# Patient Record
Sex: FEMALE | Race: WHITE
[De-identification: names, ages, dates, MRNs, and addresses within clinical notes are randomized per-mention and may not be internally consistent; named-entity substitution may affect disease eponyms.]

---

## 2019-03-20 ENCOUNTER — HOSPITAL ENCOUNTER (EMERGENCY)
Dept: HOSPITAL 26 - MED | Age: 34
LOS: 1 days | Discharge: HOME | End: 2019-03-21
Payer: SELF-PAY

## 2019-03-20 VITALS — DIASTOLIC BLOOD PRESSURE: 73 MMHG | SYSTOLIC BLOOD PRESSURE: 125 MMHG

## 2019-03-20 VITALS — WEIGHT: 221.25 LBS | BODY MASS INDEX: 37.77 KG/M2 | HEIGHT: 64 IN

## 2019-03-20 DIAGNOSIS — O20.0: Primary | ICD-10-CM

## 2019-03-20 LAB
BASOPHILS # BLD AUTO: 0 K/UL (ref 0–0.22)
BASOPHILS NFR BLD AUTO: 0.3 % (ref 0–2)
EOSINOPHIL # BLD AUTO: 0.2 K/UL (ref 0–0.4)
EOSINOPHIL NFR BLD AUTO: 1.3 % (ref 0–4)
ERYTHROCYTE [DISTWIDTH] IN BLOOD BY AUTOMATED COUNT: 16.6 % (ref 11.6–13.7)
HCT VFR BLD AUTO: 38.2 % (ref 36–48)
HGB BLD-MCNC: 12.4 G/DL (ref 12–16)
LYMPHOCYTES # BLD AUTO: 3.4 K/UL (ref 2.5–16.5)
LYMPHOCYTES NFR BLD AUTO: 29.1 % (ref 20.5–51.1)
MCH RBC QN AUTO: 26 PG (ref 27–31)
MCHC RBC AUTO-ENTMCNC: 33 G/DL (ref 33–37)
MCV RBC AUTO: 79.9 FL (ref 80–94)
MONOCYTES # BLD AUTO: 0.6 K/UL (ref 0.8–1)
MONOCYTES NFR BLD AUTO: 5.2 % (ref 1.7–9.3)
NEUTROPHILS # BLD AUTO: 7.5 K/UL (ref 1.8–7.7)
NEUTROPHILS NFR BLD AUTO: 64.1 % (ref 42.2–75.2)
PLATELET # BLD AUTO: 208 K/UL (ref 140–450)
RBC # BLD AUTO: 4.78 MIL/UL (ref 4.2–5.4)
WBC # BLD AUTO: 11.8 K/UL (ref 4.8–10.8)

## 2019-03-20 PROCEDURE — 36415 COLL VENOUS BLD VENIPUNCTURE: CPT

## 2019-03-20 PROCEDURE — 76817 TRANSVAGINAL US OBSTETRIC: CPT

## 2019-03-20 PROCEDURE — 81002 URINALYSIS NONAUTO W/O SCOPE: CPT

## 2019-03-20 PROCEDURE — 84702 CHORIONIC GONADOTROPIN TEST: CPT

## 2019-03-20 PROCEDURE — 81025 URINE PREGNANCY TEST: CPT

## 2019-03-20 PROCEDURE — 96372 THER/PROPH/DIAG INJ SC/IM: CPT

## 2019-03-20 PROCEDURE — 85025 COMPLETE CBC W/AUTO DIFF WBC: CPT

## 2019-03-20 PROCEDURE — 86901 BLOOD TYPING SEROLOGIC RH(D): CPT

## 2019-03-20 PROCEDURE — 86900 BLOOD TYPING SEROLOGIC ABO: CPT

## 2019-03-20 PROCEDURE — 99284 EMERGENCY DEPT VISIT MOD MDM: CPT

## 2019-03-20 NOTE — NUR
PT PRESENTS TO ED WITH SUPRAPUBIC TENDERNESS AND VAGINAL SPOTTING X1 DAY. NOT 
SOAKING PADS. BLEEDING CONTROLLED. LMP 1/11/19. 4/10 PAIN. NO VAGINAL 
DISCHARGE. A&OX4 VSS. POSITOINED IN BED FOR COMFORT. MOTHER AT BEDSIDE.

## 2019-03-21 VITALS — SYSTOLIC BLOOD PRESSURE: 117 MMHG | DIASTOLIC BLOOD PRESSURE: 70 MMHG

## 2019-03-21 NOTE — NUR
DISCHARGE PLANS GIVEN TO PT. PT CONTINUES TO C/O PAIN 6/10. DR SPARROW 
INFORMED. OFFERED TO PT TO STAY AT ED FOR FURTHER PAIN MANAGEMENT BUT PT 
DECLINED STATING. "I'LL COME BACK IF THE PAIN GETS TO BAD." ALL QESTIONS 
ANSWERED. PT VERBALIZED UNDERSTANDING. VSS. ACCOMPANIED BY MOTHER.